# Patient Record
Sex: MALE | Race: WHITE | NOT HISPANIC OR LATINO | URBAN - METROPOLITAN AREA
[De-identification: names, ages, dates, MRNs, and addresses within clinical notes are randomized per-mention and may not be internally consistent; named-entity substitution may affect disease eponyms.]

---

## 2023-04-07 ENCOUNTER — APPOINTMENT (OUTPATIENT)
Dept: URBAN - METROPOLITAN AREA CLINIC 265 | Age: 53
Setting detail: DERMATOLOGY
End: 2023-04-07

## 2023-04-07 VITALS — WEIGHT: 200 LBS | RESPIRATION RATE: 18 BRPM | HEIGHT: 68 IN

## 2023-04-07 DIAGNOSIS — B07.8 OTHER VIRAL WARTS: ICD-10-CM

## 2023-04-07 DIAGNOSIS — D49.2 NEOPLASM OF UNSPECIFIED BEHAVIOR OF BONE, SOFT TISSUE, AND SKIN: ICD-10-CM

## 2023-04-07 PROCEDURE — OTHER BENIGN DESTRUCTION: OTHER

## 2023-04-07 PROCEDURE — OTHER COUNSELING: OTHER

## 2023-04-07 PROCEDURE — OTHER MIPS QUALITY: OTHER

## 2023-04-07 PROCEDURE — OTHER BIOPSY BY SHAVE METHOD: OTHER

## 2023-04-07 PROCEDURE — 11102 TANGNTL BX SKIN SINGLE LES: CPT | Mod: 59

## 2023-04-07 PROCEDURE — 17110 DESTRUCT B9 LESION 1-14: CPT

## 2023-04-07 ASSESSMENT — LOCATION SIMPLE DESCRIPTION DERM
LOCATION SIMPLE: RIGHT THUMB
LOCATION SIMPLE: LEFT NOSE

## 2023-04-07 ASSESSMENT — LOCATION DETAILED DESCRIPTION DERM
LOCATION DETAILED: RIGHT DISTAL VENTRAL THUMB
LOCATION DETAILED: LEFT NASAL ALA

## 2023-04-07 ASSESSMENT — LOCATION ZONE DERM
LOCATION ZONE: FINGER
LOCATION ZONE: NOSE

## 2023-04-07 NOTE — PROCEDURE: BIOPSY BY SHAVE METHOD
Information: Selecting Yes will display possible errors in your note based on the variables you have selected. This validation is only offered as a suggestion for you. PLEASE NOTE THAT THE VALIDATION TEXT WILL BE REMOVED WHEN YOU FINALIZE YOUR NOTE. IF YOU WANT TO FAX A PRELIMINARY NOTE YOU WILL NEED TO TOGGLE THIS TO 'NO' IF YOU DO NOT WANT IT IN YOUR FAXED NOTE.
Electrodesiccation And Curettage Text: The wound bed was treated with electrodesiccation and curettage after the biopsy was performed.
X Size Of Lesion In Cm: 0
Hide Additional Anticipated Plan?: No
Cryotherapy Text: The wound bed was treated with cryotherapy after the biopsy was performed.
Notification Instructions: Patient will be notified of biopsy results. However, patient instructed to call the office if not contacted within 2 weeks.
Anesthesia Volume In Cc (Will Not Render If 0): 0.5
Silver Nitrate Text: The wound bed was treated with silver nitrate after the biopsy was performed.
Biopsy Type: H and E
Anesthesia Type: 1% lidocaine with epinephrine
Wound Care: Petrolatum
Billing Type: Third-Party Bill
Type Of Destruction Used: Curettage
Consent: Written consent was obtained and risks were reviewed including but not limited to scarring, infection, bleeding, scabbing, incomplete removal, nerve damage and allergy to anesthesia.
Dressing: bandage
Hemostasis: Drysol
Biopsy Method: Dermablade
Detail Level: Detailed
Was A Bandage Applied: Yes
Electrodesiccation Text: The wound bed was treated with electrodesiccation after the biopsy was performed.
Curettage Text: The wound bed was treated with curettage after the biopsy was performed.
Post-Care Instructions: I reviewed with the patient in detail post-care instructions. Patient is to keep the biopsy site dry overnight, and then apply bacitracin twice daily until healed. Patient may apply hydrogen peroxide soaks to remove any crusting.
Depth Of Biopsy: dermis

## 2023-04-07 NOTE — PROCEDURE: BENIGN DESTRUCTION
Treatment Number (Will Not Render If 0): 0
Render Note In Bullet Format When Appropriate: No
Consent: The patient's consent was obtained including but not limited to risks of crusting, scabbing, blistering, scarring, darker or lighter pigmentary change, recurrence, incomplete removal and infection.
Medical Necessity Clause: This procedure was medically necessary because the lesions that were treated were:
Detail Level: Detailed
Medical Necessity Information: It is in your best interest to select a reason for this procedure from the list below. All of these items fulfill various CMS LCD requirements except the new and changing color options.
Post-Care Instructions: I reviewed with the patient in detail post-care instructions. Patient is to wear sunprotection, and avoid picking at any of the treated lesions. Pt may apply Vaseline to crusted or scabbing areas.

## 2023-06-13 ENCOUNTER — APPOINTMENT (OUTPATIENT)
Dept: URBAN - METROPOLITAN AREA CLINIC 298 | Age: 53
Setting detail: DERMATOLOGY
End: 2023-06-13

## 2023-06-13 VITALS — HEIGHT: 69 IN | WEIGHT: 185 LBS | RESPIRATION RATE: 18 BRPM

## 2023-06-13 DIAGNOSIS — L57.0 ACTINIC KERATOSIS: ICD-10-CM

## 2023-06-13 PROCEDURE — OTHER MIPS QUALITY: OTHER

## 2023-06-13 PROCEDURE — OTHER LIQUID NITROGEN: OTHER

## 2023-06-13 PROCEDURE — 17000 DESTRUCT PREMALG LESION: CPT

## 2023-06-13 PROCEDURE — OTHER COUNSELING: OTHER

## 2023-06-13 ASSESSMENT — LOCATION ZONE DERM: LOCATION ZONE: NOSE

## 2023-06-13 ASSESSMENT — LOCATION SIMPLE DESCRIPTION DERM: LOCATION SIMPLE: LEFT NOSE

## 2023-06-13 ASSESSMENT — LOCATION DETAILED DESCRIPTION DERM: LOCATION DETAILED: LEFT NASAL ALA
